# Patient Record
(demographics unavailable — no encounter records)

---
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
## 2024-11-01 NOTE — PHYSICAL EXAM
[Normal] : No murmurs, rubs, gallops [de-identified] : anxious and cries with medical personnel [de-identified] : scarring noted [de-identified] : dressing in place [de-identified] : no swelling of LE noted [de-identified] : no discoloration of legs noted

## 2024-11-01 NOTE — REASON FOR VISIT
[Follow-Up Visit] : a follow-up visit for [Mother] : mother [FreeTextEntry2] : common femoral vein thrombosis

## 2024-11-01 NOTE — CONSULT LETTER
[Dear  ___] : Dear  [unfilled], [Courtesy Letter:] : I had the pleasure of seeing your patient, [unfilled], in my office today. [Please see my note below.] : Please see my note below. [Consult Closing:] : Thank you very much for allowing me to participate in the care of this patient.  If you have any questions, please do not hesitate to contact me. [Sincerely,] : Sincerely, [FreeTextEntry2] : Dr Dunaway [FreeTextEntry3] : Ebenezer Mata MD Pediatric Hematology/Oncology [place] [phone]
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
## 2024-11-07 NOTE — ASSESSMENT
[FreeTextEntry1] : 10/29/24 Plan: - Wash with soap and water - Continue santyl on white/tan area on arm - Bacitracin to open pink/red areas - Adaptic, Smith/Feroz Swenson - Contact pediatrician regarding consideration of Benedryl for itching - Follow up in one week [Wound Care] : wound care
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
## 2024-11-24 NOTE — CONSULT LETTER
[Dear  ___] : Dear  [unfilled], [Courtesy Letter:] : I had the pleasure of seeing your patient, [unfilled], in my office today. [Please see my note below.] : Please see my note below. [Consult Closing:] : Thank you very much for allowing me to participate in the care of this patient.  If you have any questions, please do not hesitate to contact me. [Sincerely,] : Sincerely, [FreeTextEntry2] : Dr Dunaway [FreeTextEntry3] : Ebenezer Mata MD Pediatric Hematology/Oncology [place] [phone]

## 2024-11-24 NOTE — PHYSICAL EXAM
[Normal] : No murmurs, rubs, gallops [de-identified] : cries on exam- anxious re exam [de-identified] : no swelling or erythema or leg

## 2024-11-24 NOTE — PHYSICAL EXAM
[Normal] : No murmurs, rubs, gallops [de-identified] : cries on exam- anxious re exam [de-identified] : no swelling or erythema or leg
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
## 2024-11-24 NOTE — CONSULT LETTER
[Dear  ___] : Dear  [unfilled], [Courtesy Letter:] : I had the pleasure of seeing your patient, [unfilled], in my office today. [Please see my note below.] : Please see my note below. [Consult Closing:] : Thank you very much for allowing me to participate in the care of this patient.  If you have any questions, please do not hesitate to contact me. [Sincerely,] : Sincerely, [FreeTextEntry2] : Dr Dunaway [FreeTextEntry3] : Ebenezer Mata MD Pediatric Hematology/Oncology [place] [phone]

## 2024-11-24 NOTE — CONSULT LETTER
[Dear  ___] : Dear  [unfilled], [Courtesy Letter:] : I had the pleasure of seeing your patient, [unfilled], in my office today. [Please see my note below.] : Please see my note below. [Consult Closing:] : Thank you very much for allowing me to participate in the care of this patient.  If you have any questions, please do not hesitate to contact me. [Sincerely,] : Sincerely, [FreeTextEntry2] : Dr Dunaway [FreeTextEntry3] : Ebenezer Mata MD Pediatric Hematology/Oncology [place] [phone]

## 2024-11-24 NOTE — PHYSICAL EXAM
[Normal] : No murmurs, rubs, gallops [de-identified] : cries on exam- anxious re exam [de-identified] : no swelling or erythema or leg
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
